# Patient Record
Sex: FEMALE | Race: BLACK OR AFRICAN AMERICAN | NOT HISPANIC OR LATINO | URBAN - METROPOLITAN AREA
[De-identification: names, ages, dates, MRNs, and addresses within clinical notes are randomized per-mention and may not be internally consistent; named-entity substitution may affect disease eponyms.]

---

## 2019-05-19 ENCOUNTER — INPATIENT (INPATIENT)
Facility: HOSPITAL | Age: 39
LOS: 2 days | Discharge: HOME | DRG: 418 | End: 2019-05-22
Attending: SURGERY | Admitting: SURGERY
Payer: MEDICAID

## 2019-05-19 VITALS
HEART RATE: 107 BPM | HEIGHT: 66 IN | OXYGEN SATURATION: 98 % | TEMPERATURE: 98 F | RESPIRATION RATE: 20 BRPM | SYSTOLIC BLOOD PRESSURE: 155 MMHG | WEIGHT: 285.94 LBS | DIASTOLIC BLOOD PRESSURE: 100 MMHG

## 2019-05-19 LAB
ALBUMIN SERPL ELPH-MCNC: 4.2 G/DL — SIGNIFICANT CHANGE UP (ref 3.3–5.2)
ALP SERPL-CCNC: 57 U/L — SIGNIFICANT CHANGE UP (ref 40–120)
ALT FLD-CCNC: 11 U/L — SIGNIFICANT CHANGE UP
ANION GAP SERPL CALC-SCNC: 11 MMOL/L — SIGNIFICANT CHANGE UP (ref 5–17)
APTT BLD: 31.6 SEC — SIGNIFICANT CHANGE UP (ref 27.5–36.3)
AST SERPL-CCNC: 37 U/L — HIGH
BASOPHILS # BLD AUTO: 0 K/UL — SIGNIFICANT CHANGE UP (ref 0–0.2)
BASOPHILS NFR BLD AUTO: 0.2 % — SIGNIFICANT CHANGE UP (ref 0–2)
BILIRUB SERPL-MCNC: 0.3 MG/DL — LOW (ref 0.4–2)
BUN SERPL-MCNC: 9 MG/DL — SIGNIFICANT CHANGE UP (ref 8–20)
CALCIUM SERPL-MCNC: 10.1 MG/DL — SIGNIFICANT CHANGE UP (ref 8.6–10.2)
CHLORIDE SERPL-SCNC: 102 MMOL/L — SIGNIFICANT CHANGE UP (ref 98–107)
CK SERPL-CCNC: 299 U/L — HIGH (ref 25–170)
CO2 SERPL-SCNC: 23 MMOL/L — SIGNIFICANT CHANGE UP (ref 22–29)
CREAT SERPL-MCNC: 0.77 MG/DL — SIGNIFICANT CHANGE UP (ref 0.5–1.3)
EOSINOPHIL # BLD AUTO: 0.1 K/UL — SIGNIFICANT CHANGE UP (ref 0–0.5)
EOSINOPHIL NFR BLD AUTO: 2.6 % — SIGNIFICANT CHANGE UP (ref 0–6)
GLUCOSE SERPL-MCNC: 100 MG/DL — SIGNIFICANT CHANGE UP (ref 70–115)
HCG SERPL-ACNC: <4 MIU/ML — SIGNIFICANT CHANGE UP
HCT VFR BLD CALC: 40.3 % — SIGNIFICANT CHANGE UP (ref 37–47)
HGB BLD-MCNC: 13.3 G/DL — SIGNIFICANT CHANGE UP (ref 12–16)
INR BLD: 1.08 RATIO — SIGNIFICANT CHANGE UP (ref 0.88–1.16)
LIDOCAIN IGE QN: 43 U/L — SIGNIFICANT CHANGE UP (ref 22–51)
LYMPHOCYTES # BLD AUTO: 2.5 K/UL — SIGNIFICANT CHANGE UP (ref 1–4.8)
LYMPHOCYTES # BLD AUTO: 45.2 % — SIGNIFICANT CHANGE UP (ref 20–55)
MCHC RBC-ENTMCNC: 29.1 PG — SIGNIFICANT CHANGE UP (ref 27–31)
MCHC RBC-ENTMCNC: 33 G/DL — SIGNIFICANT CHANGE UP (ref 32–36)
MCV RBC AUTO: 88.2 FL — SIGNIFICANT CHANGE UP (ref 81–99)
MONOCYTES # BLD AUTO: 0.3 K/UL — SIGNIFICANT CHANGE UP (ref 0–0.8)
MONOCYTES NFR BLD AUTO: 6.2 % — SIGNIFICANT CHANGE UP (ref 3–10)
NEUTROPHILS # BLD AUTO: 2.5 K/UL — SIGNIFICANT CHANGE UP (ref 1.8–8)
NEUTROPHILS NFR BLD AUTO: 45.6 % — SIGNIFICANT CHANGE UP (ref 37–73)
PLATELET # BLD AUTO: 296 K/UL — SIGNIFICANT CHANGE UP (ref 150–400)
POTASSIUM SERPL-MCNC: 4.5 MMOL/L — SIGNIFICANT CHANGE UP (ref 3.5–5.3)
POTASSIUM SERPL-SCNC: 4.5 MMOL/L — SIGNIFICANT CHANGE UP (ref 3.5–5.3)
PROT SERPL-MCNC: 9.5 G/DL — HIGH (ref 6.6–8.7)
PROTHROM AB SERPL-ACNC: 12.4 SEC — SIGNIFICANT CHANGE UP (ref 10–12.9)
RBC # BLD: 4.57 M/UL — SIGNIFICANT CHANGE UP (ref 4.4–5.2)
RBC # FLD: 12.7 % — SIGNIFICANT CHANGE UP (ref 11–15.6)
SODIUM SERPL-SCNC: 136 MMOL/L — SIGNIFICANT CHANGE UP (ref 135–145)
TROPONIN T SERPL-MCNC: <0.01 NG/ML — SIGNIFICANT CHANGE UP (ref 0–0.06)
WBC # BLD: 5.5 K/UL — SIGNIFICANT CHANGE UP (ref 4.8–10.8)
WBC # FLD AUTO: 5.5 K/UL — SIGNIFICANT CHANGE UP (ref 4.8–10.8)

## 2019-05-19 PROCEDURE — 71045 X-RAY EXAM CHEST 1 VIEW: CPT | Mod: 26

## 2019-05-19 PROCEDURE — 99285 EMERGENCY DEPT VISIT HI MDM: CPT | Mod: 25

## 2019-05-19 PROCEDURE — 93010 ELECTROCARDIOGRAM REPORT: CPT

## 2019-05-19 PROCEDURE — 76705 ECHO EXAM OF ABDOMEN: CPT | Mod: 26

## 2019-05-19 RX ORDER — FAMOTIDINE 10 MG/ML
20 INJECTION INTRAVENOUS ONCE
Refills: 0 | Status: COMPLETED | OUTPATIENT
Start: 2019-05-19 | End: 2019-05-19

## 2019-05-19 RX ORDER — MORPHINE SULFATE 50 MG/1
6 CAPSULE, EXTENDED RELEASE ORAL ONCE
Refills: 0 | Status: DISCONTINUED | OUTPATIENT
Start: 2019-05-19 | End: 2019-05-19

## 2019-05-19 RX ADMIN — FAMOTIDINE 20 MILLIGRAM(S): 10 INJECTION INTRAVENOUS at 22:13

## 2019-05-19 RX ADMIN — MORPHINE SULFATE 6 MILLIGRAM(S): 50 CAPSULE, EXTENDED RELEASE ORAL at 22:13

## 2019-05-19 NOTE — ED ADULT TRIAGE NOTE - CHIEF COMPLAINT QUOTE
pt c/o sharp back pain and chest pain that began three hrs ago. pt was sitting when symptoms began. pt denies n/v.

## 2019-05-19 NOTE — ED PROVIDER NOTE - CROS ED CONS ALL NEG
Impression: Dry eye syndrome of bilateral lacrimal glands: H04.123. Plan: Patient instructed to use artificial tears as needed. Patient instructed to apply warm compresses.
Impression: Nonexudative age-related macular degeneration, bilateral, early dry stage: H35.3131.  Plan: Jacklyn Pinto
Impression: Presbyopia: H52.4.  Plan:
Impression: Retinal hemorrhage, left eye: H35.62.  Plan: full cardiovascular work up with pcp including carotid artery ultrasound
- - -

## 2019-05-19 NOTE — ED STATDOCS - PROGRESS NOTE DETAILS
37 y/o F pt presents to the ED c/o chest pain starting 3 hours ago with assoc. SOB. Pt's pain is described as sharp, and radiates into her back and R upper part of her abdomen. States she was sitting down when the pain came on. Denies nausea. No further complaints at this time.

## 2019-05-19 NOTE — ED PROVIDER NOTE - OBJECTIVE STATEMENT
37 y/o F, hx of hysterectomy, , HTN, and multiple myeloma, presents to the ED c/o right upper abdominal pain, onset 4 hrs ago.  Pain is constant in nature and non radiating.  Pain is worse when bending over.  Pt also notes mid back pain.  Notes similar sx in the past that is worse with food intake.  States sx resolved on their own in the past, but appear to have returned. Associated sx include nausea.  Pt states that she has been told she has gallbladder issues in the past.  Denies self medicating for sx.  Denies fever, diaphoresis, visual changes, chest pain, SOB, vomiting, diarrhea, hematuria, dysuria, or HA.

## 2019-05-19 NOTE — ED PROVIDER NOTE - CLINICAL SUMMARY MEDICAL DECISION MAKING FREE TEXT BOX
Pt with acute onset abdominal pain, hx of gallbladder disease, with nausea, will obtain labs, US, give analgesic, and reeval

## 2019-05-19 NOTE — ED PROVIDER NOTE - PROGRESS NOTE DETAILS
Labs and US are as noted. Patient still with significant pain localized to the RUQ. Surgery called for consultation. Patient with continued RUQ pain. HIDA ordered. Will place in obs.

## 2019-05-19 NOTE — ED ADULT NURSE NOTE - NSIMPLEMENTINTERV_GEN_ALL_ED
Implemented All Fall with Harm Risk Interventions:  [place] to call system. Call bell, personal items and telephone within reach. Instruct patient to call for assistance. Room bathroom lighting operational. Non-slip footwear when patient is off stretcher. Physically safe environment: no spills, clutter or unnecessary equipment. Stretcher in lowest position, wheels locked, appropriate side rails in place. Provide visual cue, wrist band, yellow gown, etc. Monitor gait and stability. Monitor for mental status changes and reorient to person, place, and time. Review medications for side effects contributing to fall risk. Reinforce activity limits and safety measures with patient and family. Provide visual clues: red socks.

## 2019-05-19 NOTE — ED ADULT NURSE NOTE - OBJECTIVE STATEMENT
pt presents with c/o sharp back pain rad to her front pt presents with c/o sharp back pain rad to her front, + nausea with c/o chest pain . pt reports she had similar pain in the past that has resolved on its own. reports gallbladder problems in the past but never followed up outpt. currently visiting from [place]. denies fever and chills. ( poor historian)

## 2019-05-20 DIAGNOSIS — K81.0 ACUTE CHOLECYSTITIS: ICD-10-CM

## 2019-05-20 DIAGNOSIS — Z98.891 HISTORY OF UTERINE SCAR FROM PREVIOUS SURGERY: Chronic | ICD-10-CM

## 2019-05-20 DIAGNOSIS — Z90.710 ACQUIRED ABSENCE OF BOTH CERVIX AND UTERUS: Chronic | ICD-10-CM

## 2019-05-20 LAB
APPEARANCE UR: CLEAR — SIGNIFICANT CHANGE UP
BILIRUB UR-MCNC: NEGATIVE — SIGNIFICANT CHANGE UP
COLOR SPEC: YELLOW — SIGNIFICANT CHANGE UP
DIFF PNL FLD: ABNORMAL
GLUCOSE UR QL: NEGATIVE MG/DL — SIGNIFICANT CHANGE UP
KETONES UR-MCNC: ABNORMAL
LEUKOCYTE ESTERASE UR-ACNC: ABNORMAL
NITRITE UR-MCNC: NEGATIVE — SIGNIFICANT CHANGE UP
PH UR: 8 — SIGNIFICANT CHANGE UP (ref 5–8)
PROT UR-MCNC: 30 MG/DL
SP GR SPEC: 1.01 — SIGNIFICANT CHANGE UP (ref 1.01–1.02)
UROBILINOGEN FLD QL: NEGATIVE MG/DL — SIGNIFICANT CHANGE UP

## 2019-05-20 PROCEDURE — 99218: CPT

## 2019-05-20 PROCEDURE — 78226 HEPATOBILIARY SYSTEM IMAGING: CPT | Mod: 26

## 2019-05-20 PROCEDURE — 74177 CT ABD & PELVIS W/CONTRAST: CPT | Mod: 26

## 2019-05-20 PROCEDURE — 99221 1ST HOSP IP/OBS SF/LOW 40: CPT

## 2019-05-20 RX ORDER — ENOXAPARIN SODIUM 100 MG/ML
40 INJECTION SUBCUTANEOUS DAILY
Refills: 0 | Status: DISCONTINUED | OUTPATIENT
Start: 2019-05-20 | End: 2019-05-21

## 2019-05-20 RX ORDER — SODIUM CHLORIDE 9 MG/ML
2000 INJECTION, SOLUTION INTRAVENOUS ONCE
Refills: 0 | Status: COMPLETED | OUTPATIENT
Start: 2019-05-20 | End: 2019-05-20

## 2019-05-20 RX ORDER — MORPHINE SULFATE 50 MG/1
4 CAPSULE, EXTENDED RELEASE ORAL ONCE
Refills: 0 | Status: DISCONTINUED | OUTPATIENT
Start: 2019-05-20 | End: 2019-05-20

## 2019-05-20 RX ORDER — HYDROCHLOROTHIAZIDE 25 MG
12.5 TABLET ORAL DAILY
Refills: 0 | Status: DISCONTINUED | OUTPATIENT
Start: 2019-05-20 | End: 2019-05-21

## 2019-05-20 RX ORDER — ACETAMINOPHEN 500 MG
650 TABLET ORAL EVERY 6 HOURS
Refills: 0 | Status: DISCONTINUED | OUTPATIENT
Start: 2019-05-20 | End: 2019-05-21

## 2019-05-20 RX ORDER — MORPHINE SULFATE 50 MG/1
2 CAPSULE, EXTENDED RELEASE ORAL ONCE
Refills: 0 | Status: DISCONTINUED | OUTPATIENT
Start: 2019-05-20 | End: 2019-05-20

## 2019-05-20 RX ORDER — KETOROLAC TROMETHAMINE 30 MG/ML
15 SYRINGE (ML) INJECTION ONCE
Refills: 0 | Status: DISCONTINUED | OUTPATIENT
Start: 2019-05-20 | End: 2019-05-20

## 2019-05-20 RX ORDER — SODIUM CHLORIDE 9 MG/ML
1000 INJECTION, SOLUTION INTRAVENOUS
Refills: 0 | Status: DISCONTINUED | OUTPATIENT
Start: 2019-05-20 | End: 2019-05-21

## 2019-05-20 RX ORDER — AMLODIPINE BESYLATE 2.5 MG/1
10 TABLET ORAL DAILY
Refills: 0 | Status: DISCONTINUED | OUTPATIENT
Start: 2019-05-20 | End: 2019-05-21

## 2019-05-20 RX ADMIN — SODIUM CHLORIDE 666.67 MILLILITER(S): 9 INJECTION, SOLUTION INTRAVENOUS at 06:12

## 2019-05-20 RX ADMIN — MORPHINE SULFATE 6 MILLIGRAM(S): 50 CAPSULE, EXTENDED RELEASE ORAL at 05:30

## 2019-05-20 RX ADMIN — SODIUM CHLORIDE 2000 MILLILITER(S): 9 INJECTION, SOLUTION INTRAVENOUS at 12:10

## 2019-05-20 RX ADMIN — Medication 12.5 MILLIGRAM(S): at 06:57

## 2019-05-20 RX ADMIN — Medication 15 MILLIGRAM(S): at 01:38

## 2019-05-20 RX ADMIN — SODIUM CHLORIDE 100 MILLILITER(S): 9 INJECTION, SOLUTION INTRAVENOUS at 21:43

## 2019-05-20 RX ADMIN — MORPHINE SULFATE 4 MILLIGRAM(S): 50 CAPSULE, EXTENDED RELEASE ORAL at 11:21

## 2019-05-20 RX ADMIN — AMLODIPINE BESYLATE 10 MILLIGRAM(S): 2.5 TABLET ORAL at 06:56

## 2019-05-20 RX ADMIN — Medication 15 MILLIGRAM(S): at 05:30

## 2019-05-20 RX ADMIN — MORPHINE SULFATE 2 MILLIGRAM(S): 50 CAPSULE, EXTENDED RELEASE ORAL at 01:38

## 2019-05-20 RX ADMIN — MORPHINE SULFATE 4 MILLIGRAM(S): 50 CAPSULE, EXTENDED RELEASE ORAL at 09:49

## 2019-05-20 RX ADMIN — MORPHINE SULFATE 2 MILLIGRAM(S): 50 CAPSULE, EXTENDED RELEASE ORAL at 05:30

## 2019-05-20 NOTE — H&P ADULT - ATTENDING COMMENTS
Seen and examined.    Hx as per resident note above.    NAD  Non labored resp  Abd obese, minimal RUQ tenderness, no guarding, neg Ruiz's    HIDA consistent without GB filling.  Consistent with acute cholecystitis.     Given continued RUQ pain will admit and treat as acute cholecystitis.  Will discuss further with Dr. Ramírez, possible cholecystectomy.

## 2019-05-20 NOTE — ED CDU PROVIDER INITIAL DAY NOTE - ATTENDING CONTRIBUTION TO CARE
I, Kim Whitt, participated in the care of this patient with the PA. I discussed the history and physical exam findings as well as lab results and plan of care with the PA. I agree with PA's history, physical and assessment.

## 2019-05-20 NOTE — ED ADULT NURSE REASSESSMENT NOTE - GENERAL PATIENT STATE
comfortable appearance/resting/sleeping
resting/sleeping/comfortable appearance
improvement verbalized/resting/sleeping/smiling/interactive/comfortable appearance/cooperative

## 2019-05-20 NOTE — H&P ADULT - NSHPPHYSICALEXAM_GEN_ALL_CORE
GENERAL: Morbid obesity. Alert, well developed, in no acute distress.  MENTAL STATUS: AAOx3. Appropriate affect.  HEENT: PERRLA. EOMI. MMM.  Trachea midline. No lymph node swelling or tenderness.  RESPIRATORY: CTAB. No wheezing, rales or rhonchi.  CARDIOVASCULAR: RRR. No audible murmurs, rubs or gallops.   GASTROINTESTINAL: Abdomen soft, RUQ TTP, Right costal TTP, ND, -R/-G.  No pulsatile mass, no flank tenderness or suprapubic tenderness. No hepatosplenomegaly.  NEUROLOGIC: Cranial nerves II-XII grossly intact. No focal neurological deficits. Moves all extremities spontaneously. Sensation intact bilaterally.  INTEGUMENTARY: No overt rashes or lesions, petechia or purpura. Good turgor. No edema.  MUSCULOSKELETAL: No cyanosis or clubbing. No gross deformities.   LYMPHATIC: Palpation of neck reveals no swelling or tenderness of neck nodes. Palpation of groin reveals no swelling or tenderness of groin nodes.

## 2019-05-20 NOTE — CONSULT NOTE ADULT - SUBJECTIVE AND OBJECTIVE BOX
ACUTE CARE SURGERY CONSULT    HPI: 37 y/o F w/ PMH of Multiple myeloma (in remission for 1yr), HTN and PSH of , DEJUAN, Hernia repair presenting with one day fo RUQ abdominal pain with associated nausea. Denies F/C/CP/SOB. Denies dysuria, malodorous urine, + increased urinary frequency. Patient states that she first had similar pain several years ago. The onset of the pain was associated with bending over. Patient states that she had cabbage and chicken prior to the onset of pain.     PAST MEDICAL HISTORY:  Multiple myeloma  HTN (hypertension)    PAST SURGICAL HISTORY:  H/O:   H/O total hysterectomy    ALLERGIES:  No Known Drug Allergies  Seafood (Angioedema)    FAMILY HISTORY: Noncontributory    SOCIAL HISTORY: Denies tobacco, EtOH, illicit substance use.     HOME MEDICATIONS: Denies      VITALS & I/Os:  Vital Signs Last 24 Hrs  T(C): 36.3 (20 May 2019 03:43), Max: 36.9 (19 May 2019 19:27)  T(F): 97.4 (20 May 2019 03:43), Max: 98.4 (19 May 2019 19:27)  HR: 93 (20 May 2019 03:43) (93 - 107)  BP: 122/77 (20 May 2019 03:43) (122/77 - 163/107)  BP(mean): --  RR: 18 (20 May 2019 03:43) (18 - 20)  SpO2: 100% (20 May 2019 03:43) (97% - 100%)  CAPILLARY BLOOD GLUCOSE      GENERAL: Morbid obesity. Alert, well developed, in no acute distress.  MENTAL STATUS: AAOx3. Appropriate affect.  HEENT: PERRLA. EOMI. MMM.  Trachea midline. No lymph node swelling or tenderness.  RESPIRATORY: CTAB. No wheezing, rales or rhonchi.  CARDIOVASCULAR: RRR. No audible murmurs, rubs or gallops.   GASTROINTESTINAL: Abdomen soft, RUQ TTP, Right costal TTP, ND, -R/-G.  No pulsatile mass, no flank tenderness or suprapubic tenderness. No hepatosplenomegaly.  NEUROLOGIC: Cranial nerves II-XII grossly intact. No focal neurological deficits. Moves all extremities spontaneously. Sensation intact bilaterally.  INTEGUMENTARY: No overt rashes or lesions, petechia or purpura. Good turgor. No edema.  MUSCULOSKELETAL: No cyanosis or clubbing. No gross deformities.   LYMPHATIC: Palpation of neck reveals no swelling or tenderness of neck nodes. Palpation of groin reveals no swelling or tenderness of groin nodes.    LABS:                        13.3   5.5   )-----------( 296      ( 19 May 2019 21:18 )             40.3         136  |  102  |  9.0  ----------------------------<  100  4.5   |  23.0  |  0.77    Ca    10.1      19 May 2019 21:18    TPro  9.5<H>  /  Alb  4.2  /  TBili  0.3<L>  /  DBili  x   /  AST  37<H>  /  ALT  11  /  AlkPhos  57      Lactate:    PT/INR - ( 19 May 2019 21:18 )   PT: 12.4 sec;   INR: 1.08 ratio         PTT - ( 19 May 2019 21:18 )  PTT:31.6 sec    CARDIAC MARKERS ( 19 May 2019 21:18 )  x     / <0.01 ng/mL / 299 U/L / x     / x            Urinalysis Basic - ( 20 May 2019 03:08 )    Color: Yellow / Appearance: Clear / S.010 / pH: x  Gluc: x / Ketone: Trace  / Bili: Negative / Urobili: Negative mg/dL   Blood: x / Protein: 30 mg/dL / Nitrite: Negative   Leuk Esterase: Trace / RBC: 0-2 /HPF / WBC 0-2   Sq Epi: x / Non Sq Epi: Occasional / Bacteria: x        IMAGING:       EXAM:  US GALLBLADDER                          PROCEDURE DATE:  2019          INTERPRETATION:  CLINICAL INFORMATION: Right upper quadrant pain for one   day.    COMPARISON: None available.    TECHNIQUE: Focused ultrasound of the gallbladder was performed.     FINDINGS:    Liver: Visualized portions are within normal limits.  Bile ducts: The partially visualized proximal  extra-hepatic duct   measures 4 mm    Gallbladder: Multiple mobile gallstones.  No gallbladder wall thickening.    The patient had diffuse right upper quadrant pain during ultrasound   scanning, however the sonographic Ruiz sign was negative.        IMPRESSION: Cholelithiasis.  Diffuse right upper quadrant pain with   negative sonographic Ruiz sign.  Cross-sectional imaging or nuclear   medicine HIDA scan may be obtained as clinically warranted.         EXAM:  CT ABDOMEN AND PELVIS IC                          PROCEDURE DATE:  2019          INTERPRETATION:  CLINICAL INFORMATION: Abdominal pain    COMPARISON: Gallbladder sonogram performed on the same day.    PROCEDURE:   CT of the Abdomen and Pelvis was performed with intravenous contrast.   Intravenous contrast: 95 ml Omnipaque 300. 5 ml discarded.  Oral contrast: None.  Sagittal and coronal reformats were performed.    FINDINGS:    LOWER CHEST: Mild bibasilar dependent atelectasis.    LIVER: Mild hepatic steatosis.  BILE DUCTS: Normal caliber.  GALLBLADDER: Mildly distended. Cholelithiasis. No pericholecystic   inflammatory changes.  SPLEEN: Within normal limits.  PANCREAS: Within normal limits.  ADRENALS: Within normal limits.  KIDNEYS/URETERS: Enhance symmetrically without hydronephrosis. Right   renal cyst and subcentimeter low-attenuation lesion that is too small to   characterize.    BLADDER: Within normal limits.  REPRODUCTIVE ORGANS: Uterus is absent. Involuted left ovarian corpus   luteal cyst.    BOWEL: No bowel obstruction or overt bowel wall thickening. Normal   appendix apart from a distal appendicolith.  PERITONEUM: Trace pelvic free fluid. No pneumoperitoneum or loculated   collection. No mesenteric adenopathy.  VESSELS:  Within normal limits.  RETROPERITONEUM: No lymphadenopathy.    ABDOMINAL WALL: Small fat-containing umbilical hernia.  BONES: Mild degenerative changes of the spine.    IMPRESSION:     No bowel obstruction or evidence of bowel inflammation.    Cholelithiasis. No pericholecystic inflammatory changes.    Involuted left ovarian corpus luteal cyst. Trace pelvic free fluid.

## 2019-05-20 NOTE — CONSULT NOTE ADULT - ASSESSMENT
37 y/o F w/ PMH of Multiple myeloma (in remission for 1yr), HTN and PSH of , DEJUAN, Hernia repair presenting with one day fo RUQ abdominal pain with associated nausea. No evidence of acute cholecystitis on U/S or CT A/P. No other obvious cause on imaging.   -Recommend HIDA to rule out cholecystitis  -Will update recommendations once results available.

## 2019-05-20 NOTE — CONSULT NOTE ADULT - ATTENDING COMMENTS
avss  ttp to right chest wall, right upper quadrant and right flank  labs wnl, imaging reviewed  clinically no acute cholecystitis , recommend po trial vs hida, will follow.

## 2019-05-20 NOTE — ED ADULT NURSE REASSESSMENT NOTE - NSIMPLEMENTINTERV_GEN_ALL_ED
Implemented All Universal Safety Interventions:  [place] to call system. Call bell, personal items and telephone within reach. Instruct patient to call for assistance. Room bathroom lighting operational. Non-slip footwear when patient is off stretcher. Physically safe environment: no spills, clutter or unnecessary equipment. Stretcher in lowest position, wheels locked, appropriate side rails in place.
Implemented All Universal Safety Interventions:  [place] to call system. Call bell, personal items and telephone within reach. Instruct patient to call for assistance. Room bathroom lighting operational. Non-slip footwear when patient is off stretcher. Physically safe environment: no spills, clutter or unnecessary equipment. Stretcher in lowest position, wheels locked, appropriate side rails in place.

## 2019-05-20 NOTE — H&P ADULT - HISTORY OF PRESENT ILLNESS
39 y/o F w/ PMH of Multiple myeloma (in remission for 1yr), HTN and PSH of , DEJUAN, Hernia repair presenting with one day fo RUQ abdominal pain with associated nausea. Denies F/C/CP/SOB. Denies dysuria, malodorous urine, + increased urinary frequency. Patient states that she first had similar pain several years ago. The onset of the pain was associated with bending over. Patient states that she had cabbage and chicken prior to the onset of pain.

## 2019-05-20 NOTE — ED CDU PROVIDER INITIAL DAY NOTE - PROGRESS NOTE DETAILS
Patient seen by surgery. Recommending HIDA. Pending negative scan, PO challenge.  PCP: Does not remember name; in [place], [place] Patient with cholelithiasis, without evidence of acute cholecystitis. Pain persists. Patient seen by surgery. Recommending HIDA. Pending negative scan, PO challenge.  PCP: Does not remember name; in [place], CT Radiology called and reports + acute cesar. Surg called and will see pt soon. PT told of results. awaiting surg.

## 2019-05-20 NOTE — ED CDU PROVIDER DISPOSITION NOTE - ATTENDING CONTRIBUTION TO CARE
I, Kim Whitt, participated in the care of this patient with the PA. I discussed the history and physical exam findings as well as lab results and plan of care with the PA. I agree with PA's history, physical and assessment. I agree with disposition.

## 2019-05-20 NOTE — ED ADULT NURSE REASSESSMENT NOTE - NS ED NURSE REASSESS COMMENT FT1
Pt alert and oriented, no apparent distress noted at this time. Pt handed off to jenae PATTERSON in stable condition.
Pt handed off to RN BRYAN in stable condition. Pt continues to refuse cardiac monitor. Pt with bolus infusing as ordered. Pt oriented to unit, plan of care explained. Call bell given to pt and call bell system explained to pt. No apparent distress noted at this time.
Surgery team at bedside, MD Ramírez and resident at bedside aura mtz.
assumed care of pt @ 0640, report received from camron PATTERSON charting as noted. Pt AOx4 in NAD, Vital Signs Stable. Pt c/o mild right upper abd pain radiating to back. Pt verbalizes some relief from pain from morphine and toradol medications. Pt currently refusing cardiac monitor, PA aware. HR is regular, lung sounds are clear b/l, skin is warm, dry and appropriate for age and race. pt educated on plan of care and observation stay. Plan of care taught back to RN. Proficiency determined from successful pt teach back. Pt oriented to unit, staff, and room. Pt reeducated on call bell use. Bed locked in lowest position, call bell within reach. All questions and concerns addressed.
pt continues to refuse to have telemetry monitor on. " Im too uncomfortable with them." reasons for the monitor reviewed with pt.
pt talking on cellphone , will comeback to start IV, pt given ua cup for spec.
returned from [facility], reports feeling much better after the morphine.
Pt is sleeping in stretcher comfortably at this time, no apparent distress noted. Pt easily arousable to verbal stimuli. Pt safety maintained, RN reinforced use of call bell when needed. RN educated and updated pt on plan of care. Pt expresses understanding, and able to teach plan of care back to RN. RN will continue to update pt regarding plan of care.
Assumed pt care, pt complaining of abdominal pain, medicated as ordered, pt is A+Ox3, VS as noted. Pt educated on plan of care, able to successfully teach back plan of care to RN. RN will continue to update pt throughout ED stay.
Pt sleeping comfortably in stretcher, verbalizes improvement in pain s/p medication as ordered. Pt updated on plan of care, awaiting CT at this time, able to successfully teach back plan of care to RN. RN will continue to update pt throughout ED stay.

## 2019-05-20 NOTE — ED CDU PROVIDER DISPOSITION NOTE - CLINICAL COURSE
37 y/o F, hx of hysterectomy, , HTN, and multiple myeloma, presents to the ED c/o right upper abdominal pain, onset 4 hrs ago.  Pain is constant in nature and non radiating.  Pain is worse when bending over.  Pt also notes mid back pain.  Notes similar sx in the past that is worse with food intake.  States sx resolved on their own in the past, but appear to have returned. Associated sx include nausea.  Pt states that she has been told she has gallbladder issues in the past.  Denies self medicating for sx.  Denies fever, diaphoresis, visual changes, chest pain, SOB, vomiting, diarrhea, hematuria, dysuria, or HA. PT had HIDA and positive for acute cesar. Surg saw pt and will admit.

## 2019-05-20 NOTE — H&P ADULT - ASSESSMENT
39 y/o F w/ PMH of Multiple myeloma (in remission for 1yr), HTN and PSH of , DEJUAN, Hernia repair presenting with one day fo RUQ abdominal pain with associated nausea. No evidence of acute cholecystitis on U/S or CT A/P.  - HIDA positive for acute cholecystitis    Plan:  - Admit to [facility] for acute cholecystitis  - Will need cholecystectomy on this admission  - IV fluids  - Restart home medications  - NPO at midnight

## 2019-05-20 NOTE — ED ADULT NURSE REASSESSMENT NOTE - INV PAIN INTERVENTIONS-NUMBER SCALE
medicated as ordered./multiple medication modalities
single medication modality
single medication modality

## 2019-05-21 LAB
ALLERGY+IMMUNOLOGY DIAG STUDY NOTE: SIGNIFICANT CHANGE UP
ANION GAP SERPL CALC-SCNC: 14 MMOL/L — SIGNIFICANT CHANGE UP (ref 5–17)
BASOPHILS # BLD AUTO: 0 K/UL — SIGNIFICANT CHANGE UP (ref 0–0.2)
BASOPHILS NFR BLD AUTO: 0.5 % — SIGNIFICANT CHANGE UP (ref 0–2)
BLD GP AB SCN SERPL QL: ABNORMAL
BUN SERPL-MCNC: 7 MG/DL — LOW (ref 8–20)
CALCIUM SERPL-MCNC: 9.6 MG/DL — SIGNIFICANT CHANGE UP (ref 8.6–10.2)
CHLORIDE SERPL-SCNC: 102 MMOL/L — SIGNIFICANT CHANGE UP (ref 98–107)
CO2 SERPL-SCNC: 24 MMOL/L — SIGNIFICANT CHANGE UP (ref 22–29)
CREAT SERPL-MCNC: 0.57 MG/DL — SIGNIFICANT CHANGE UP (ref 0.5–1.3)
DIR ANTIGLOB POLYSPECIFIC INTERPRETATION: SIGNIFICANT CHANGE UP
EOSINOPHIL # BLD AUTO: 0.1 K/UL — SIGNIFICANT CHANGE UP (ref 0–0.5)
EOSINOPHIL NFR BLD AUTO: 3.2 % — SIGNIFICANT CHANGE UP (ref 0–6)
GLUCOSE SERPL-MCNC: 84 MG/DL — SIGNIFICANT CHANGE UP (ref 70–115)
HCT VFR BLD CALC: 38.9 % — SIGNIFICANT CHANGE UP (ref 37–47)
HGB BLD-MCNC: 12.9 G/DL — SIGNIFICANT CHANGE UP (ref 12–16)
LYMPHOCYTES # BLD AUTO: 1.9 K/UL — SIGNIFICANT CHANGE UP (ref 1–4.8)
LYMPHOCYTES # BLD AUTO: 50.3 % — SIGNIFICANT CHANGE UP (ref 20–55)
MAGNESIUM SERPL-MCNC: 1.9 MG/DL — SIGNIFICANT CHANGE UP (ref 1.6–2.6)
MCHC RBC-ENTMCNC: 29.4 PG — SIGNIFICANT CHANGE UP (ref 27–31)
MCHC RBC-ENTMCNC: 33.2 G/DL — SIGNIFICANT CHANGE UP (ref 32–36)
MCV RBC AUTO: 88.6 FL — SIGNIFICANT CHANGE UP (ref 81–99)
MONOCYTES # BLD AUTO: 0.3 K/UL — SIGNIFICANT CHANGE UP (ref 0–0.8)
MONOCYTES NFR BLD AUTO: 7.4 % — SIGNIFICANT CHANGE UP (ref 3–10)
NEUTROPHILS # BLD AUTO: 1.5 K/UL — LOW (ref 1.8–8)
NEUTROPHILS NFR BLD AUTO: 38.6 % — SIGNIFICANT CHANGE UP (ref 37–73)
PHOSPHATE SERPL-MCNC: 3.7 MG/DL — SIGNIFICANT CHANGE UP (ref 2.4–4.7)
PLATELET # BLD AUTO: 290 K/UL — SIGNIFICANT CHANGE UP (ref 150–400)
POTASSIUM SERPL-MCNC: 3.3 MMOL/L — LOW (ref 3.5–5.3)
POTASSIUM SERPL-SCNC: 3.3 MMOL/L — LOW (ref 3.5–5.3)
RBC # BLD: 4.39 M/UL — LOW (ref 4.4–5.2)
RBC # FLD: 12.7 % — SIGNIFICANT CHANGE UP (ref 11–15.6)
SODIUM SERPL-SCNC: 140 MMOL/L — SIGNIFICANT CHANGE UP (ref 135–145)
TYPE + AB SCN PNL BLD: SIGNIFICANT CHANGE UP
WBC # BLD: 3.8 K/UL — LOW (ref 4.8–10.8)
WBC # FLD AUTO: 3.8 K/UL — LOW (ref 4.8–10.8)

## 2019-05-21 PROCEDURE — 47562 LAPAROSCOPIC CHOLECYSTECTOMY: CPT

## 2019-05-21 PROCEDURE — 86077 PHYS BLOOD BANK SERV XMATCH: CPT

## 2019-05-21 PROCEDURE — 88304 TISSUE EXAM BY PATHOLOGIST: CPT | Mod: 26

## 2019-05-21 RX ORDER — ACETAMINOPHEN 500 MG
650 TABLET ORAL EVERY 6 HOURS
Refills: 0 | Status: DISCONTINUED | OUTPATIENT
Start: 2019-05-21 | End: 2019-05-22

## 2019-05-21 RX ORDER — SODIUM CHLORIDE 9 MG/ML
1000 INJECTION, SOLUTION INTRAVENOUS
Refills: 0 | Status: DISCONTINUED | OUTPATIENT
Start: 2019-05-21 | End: 2019-05-21

## 2019-05-21 RX ORDER — METOCLOPRAMIDE HCL 10 MG
10 TABLET ORAL ONCE
Refills: 0 | Status: DISCONTINUED | OUTPATIENT
Start: 2019-05-21 | End: 2019-05-21

## 2019-05-21 RX ORDER — TRAMADOL HYDROCHLORIDE 50 MG/1
50 TABLET ORAL EVERY 6 HOURS
Refills: 0 | Status: DISCONTINUED | OUTPATIENT
Start: 2019-05-21 | End: 2019-05-22

## 2019-05-21 RX ORDER — AMLODIPINE BESYLATE 2.5 MG/1
10 TABLET ORAL DAILY
Refills: 0 | Status: DISCONTINUED | OUTPATIENT
Start: 2019-05-21 | End: 2019-05-22

## 2019-05-21 RX ORDER — ONDANSETRON 8 MG/1
4 TABLET, FILM COATED ORAL ONCE
Refills: 0 | Status: DISCONTINUED | OUTPATIENT
Start: 2019-05-21 | End: 2019-05-21

## 2019-05-21 RX ORDER — FENTANYL CITRATE 50 UG/ML
25 INJECTION INTRAVENOUS
Refills: 0 | Status: DISCONTINUED | OUTPATIENT
Start: 2019-05-21 | End: 2019-05-21

## 2019-05-21 RX ORDER — FENTANYL CITRATE 50 UG/ML
50 INJECTION INTRAVENOUS
Refills: 0 | Status: DISCONTINUED | OUTPATIENT
Start: 2019-05-21 | End: 2019-05-21

## 2019-05-21 RX ORDER — ENOXAPARIN SODIUM 100 MG/ML
40 INJECTION SUBCUTANEOUS DAILY
Refills: 0 | Status: DISCONTINUED | OUTPATIENT
Start: 2019-05-21 | End: 2019-05-22

## 2019-05-21 RX ORDER — HYDROCHLOROTHIAZIDE 25 MG
12.5 TABLET ORAL DAILY
Refills: 0 | Status: DISCONTINUED | OUTPATIENT
Start: 2019-05-21 | End: 2019-05-22

## 2019-05-21 RX ADMIN — AMLODIPINE BESYLATE 10 MILLIGRAM(S): 2.5 TABLET ORAL at 05:04

## 2019-05-21 RX ADMIN — Medication 12.5 MILLIGRAM(S): at 05:04

## 2019-05-21 RX ADMIN — FENTANYL CITRATE 50 MICROGRAM(S): 50 INJECTION INTRAVENOUS at 13:19

## 2019-05-21 RX ADMIN — FENTANYL CITRATE 50 MICROGRAM(S): 50 INJECTION INTRAVENOUS at 13:10

## 2019-05-21 RX ADMIN — TRAMADOL HYDROCHLORIDE 50 MILLIGRAM(S): 50 TABLET ORAL at 13:00

## 2019-05-21 RX ADMIN — ENOXAPARIN SODIUM 40 MILLIGRAM(S): 100 INJECTION SUBCUTANEOUS at 18:17

## 2019-05-21 NOTE — PROGRESS NOTE ADULT - SUBJECTIVE AND OBJECTIVE BOX
The patient is a 38y old female who presents with a chief complaint of Acute Cholecystitis.  She is scheduled to have a LAPAROSCOPIC CHOLECYSTECTOMY.                  (20 May 2019 13:14)      PAST MEDICAL HISTORY:  Multiple myeloma in remission post chemotherapy  Hypertension x 6 years      PAST SURGICAL HISTORY:    Total hysterectomy      MEDICATIONS  (STANDING):  amLODIPine   Tablet 10 milliGRAM(s) Oral daily  enoxaparin Injectable 40 milliGRAM(s) SubCutaneous daily  hydrochlorothiazide 12.5 milliGRAM(s) Oral daily  multiple electrolytes Injection Type 1 1000 milliLiter(s) (100 mL/Hr) IV Continuous <Continuous>    MEDICATIONS  (PRN):  acetaminophen   Tablet .. 650 milliGRAM(s) Oral every 6 hours PRN Mild Pain (1 - 3)      Allergies:     No Known Drug Allergies                    Seafood (Angioedema)    SOCIAL HISTORY:     The patient says that she doesn't smoke, drink alcohol or use illicit drugs.                        13.3   5.5   )-----------( 296      ( 19 May 2019 21:18 )             40.3     PT/INR - ( 19 May 2019 21:18 )   PT: 12.4 sec;   INR: 1.08 ratio       PTT - ( 19 May 2019 21:18 )  PTT:31.6 sec        136  |  102  |  9.0  ----------------------------<  100  4.5   |  23.0  |  0.77    Ca    10.1      19 May 2019 21:18    TPro  9.5<H>  /  Alb  4.2  /  TBili  0.3<L>  /  DBili  x   /  AST  37<H>  /  ALT  11  /  AlkPhos  57      EKG:  -  2019  Normal sinus rhythm  Nonspecific T wave abnormality  Abnormal ECG    TT ECHO:  None    ASA # = 3  Mallampati # = 3 The patient is a 38y old female who presents with a chief complaint of Acute Cholecystitis.  She is scheduled to have a LAPAROSCOPIC CHOLECYSTECTOMY.                  (20 May 2019 13:14)      PAST MEDICAL HISTORY:  Multiple myeloma in remission for 2 years post chemotherapy  Hypertension x 6 years  Morbid obesity - B.M.I. = 46.2    PAST SURGICAL HISTORY:    Total hysterectomy      MEDICATIONS  (STANDING):  amLODIPine   Tablet 10 milliGRAM(s) Oral daily  enoxaparin Injectable 40 milliGRAM(s) SubCutaneous daily  hydrochlorothiazide 12.5 milliGRAM(s) Oral daily  multiple electrolytes Injection Type 1 1000 milliLiter(s) (100 mL/Hr) IV Continuous <Continuous>    MEDICATIONS  (PRN):  acetaminophen   Tablet .. 650 milliGRAM(s) Oral every 6 hours PRN Mild Pain (1 - 3)      Allergies:     No Known Drug Allergies                    Seafood (Angioedema)    SOCIAL HISTORY:     The patient says that she doesn't smoke, drink alcohol or use illicit drugs.                        13.3   5.5   )-----------( 296      ( 19 May 2019 21:18 )             40.3     PT/INR - ( 19 May 2019 21:18 )   PT: 12.4 sec;   INR: 1.08 ratio       PTT - ( 19 May 2019 21:18 )  PTT:31.6 sec        136  |  102  |  9.0  ----------------------------<  100  4.5   |  23.0  |  0.77    Ca    10.1      19 May 2019 21:18    TPro  9.5<H>  /  Alb  4.2  /  TBili  0.3<L>  /  DBili  x   /  AST  37<H>  /  ALT  11  /  AlkPhos  57      EKG:  -  2019  Normal sinus rhythm  Nonspecific T wave abnormality  Abnormal ECG    TT ECHO:  None    Nuclear Medicine Hepatobilliary Imaging. - 2019  IMPRESSION:  Abnormal morphine-augmented hepatobiliary scan compatible   with acute cholecystitis.    ASA # = 3  Mallampati # = 3

## 2019-05-21 NOTE — PROGRESS NOTE ADULT - ASSESSMENT
37 y/o F w/ PMH of Multiple myeloma (in remission for 1yr), HTN and PSH of , DEJUAN, Hernia repair presenting with one day fo RUQ abdominal pain with associated nausea. No evidence of acute cholecystitis on U/S or CT A/P.  - HIDA positive for acute cholecystitis  - OR today 19 for lap cesar

## 2019-05-21 NOTE — CHART NOTE - NSCHARTNOTEFT_GEN_A_CORE
POST OPERATIVE NOTE    Patient is a 39yo F p/w cholecystitis now s/p lap csear, uncomplicated, minimal bleeding, stable intraoperatively and postoperatively.  Pt now seen at bedside resting comfortably.  Pt is ambulating, voiding, tolerating diet, and pain well controlled.  No n/v/sob/cp/f/ch.    T(C): 36.8 (05-21-19 @ 15:51), Max: 37 (05-20-19 @ 20:40)  HR: 88 (05-21-19 @ 15:51) (75 - 108)  BP: 136/96 (05-21-19 @ 15:51) (121/79 - 147/93)  RR: 18 (05-21-19 @ 15:51) (12 - 18)  SpO2: 95% (05-21-19 @ 15:51) (95% - 100%)        NAD, AAOx3, resting comfortably in bed  No respiratory distress  Abdomen soft, mildly tender at incision sites, nondistended. No guarding or rebound.  Incision sites clean, dry, intact  No peripheral edema. Normal ROM.    Imaging:     Assessment: 39yo F s/p lap cesar, uncomplicated, adequate recovery.    Plan:  -Continue pain control  -continue regular diet  -Encourage OOB, ambulation, IS  -dc home tomorrow  -work note to be written and given to patient as per patient's request

## 2019-05-22 VITALS
DIASTOLIC BLOOD PRESSURE: 82 MMHG | TEMPERATURE: 98 F | SYSTOLIC BLOOD PRESSURE: 138 MMHG | OXYGEN SATURATION: 96 % | HEART RATE: 90 BPM | RESPIRATION RATE: 18 BRPM

## 2019-05-22 PROCEDURE — 99024 POSTOP FOLLOW-UP VISIT: CPT

## 2019-05-22 RX ORDER — TRAMADOL HYDROCHLORIDE 50 MG/1
1 TABLET ORAL
Qty: 8 | Refills: 0
Start: 2019-05-22

## 2019-05-22 RX ORDER — AMLODIPINE BESYLATE 2.5 MG/1
1 TABLET ORAL
Qty: 0 | Refills: 0 | DISCHARGE
Start: 2019-05-22

## 2019-05-22 RX ADMIN — TRAMADOL HYDROCHLORIDE 50 MILLIGRAM(S): 50 TABLET ORAL at 05:50

## 2019-05-22 RX ADMIN — TRAMADOL HYDROCHLORIDE 50 MILLIGRAM(S): 50 TABLET ORAL at 06:50

## 2019-05-22 RX ADMIN — Medication 12.5 MILLIGRAM(S): at 05:50

## 2019-05-22 RX ADMIN — AMLODIPINE BESYLATE 10 MILLIGRAM(S): 2.5 TABLET ORAL at 05:50

## 2019-05-22 NOTE — DISCHARGE NOTE NURSING/CASE MANAGEMENT/SOCIAL WORK - NSDCPETBCESMAN_GEN_ALL_CORE
Hospitalist has approved for pt. To receive pain medication early this afternoon.  (4 hours after previous dose) Next dose will return to Q6 as order indicates. Yane Nunez RN    Hospitalist requested peripheral IV be placed. Picc line to be removed once infusion is complete. Yane Nunez RN   If you are a smoker, it is important for your health to stop smoking. Please be aware that second hand smoke is also harmful.

## 2019-05-22 NOTE — DISCHARGE NOTE PROVIDER - PROVIDER TOKENS
FREE:[LAST:[surgery],FIRST:[acute jackelin],PHONE:[phone]],FAX:[(   [phone]    -],ADDRESS:[[address] [place] [address]]]

## 2019-05-22 NOTE — CHART NOTE - NSCHARTNOTEFT_GEN_A_CORE
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 The K or Bivalve antigen belongs to the Ghada group of red cell antigens. It is considered to be clinically significant and associated with moderate to severe transfusion reactions and FHDN (fetal hemolytic disease of the ) In HDFN, The Anti-Bivalve antibody can also cause profound anemia in the  secondary to suppression of hematopoiesis. This effect can persist for several weeks following delivery. The Ghada antigen is only expressed on the red cells of 9% of Caucasians (91% lack the antigen) and on 2% (98% lack the antigen) of red cells of AAs. Z01.84

## 2019-05-22 NOTE — PROGRESS NOTE ADULT - SUBJECTIVE AND OBJECTIVE BOX
INTERVAL HPI/OVERNIGHT EVENTS:  No acute overnight events reported. Patient tolerating diet, voiding freely and having soft BMs as reported. Patient states that her pain is well managed at this moment. Patient denies SOB, chest pain, nausea, vomiting, fevers, chills nor diarrhea.          MEDICATIONS  (STANDING):  amLODIPine   Tablet 10 milliGRAM(s) Oral daily  enoxaparin Injectable 40 milliGRAM(s) SubCutaneous daily  hydrochlorothiazide 12.5 milliGRAM(s) Oral daily    MEDICATIONS  (PRN):  acetaminophen   Tablet .. 650 milliGRAM(s) Oral every 6 hours PRN Mild Pain (1 - 3)  traMADol 50 milliGRAM(s) Oral every 6 hours PRN Moderate Pain (4 - 6)      Vital Signs Last 24 Hrs  T(C): 37 (21 May 2019 23:32), Max: 37 (21 May 2019 23:32)  T(F): 98.6 (21 May 2019 23:32), Max: 98.6 (21 May 2019 23:32)  HR: 96 (21 May 2019 23:32) (75 - 98)  BP: 121/70 (21 May 2019 23:32) (121/70 - 147/93)  BP(mean): --  RR: 18 (21 May 2019 23:32) (12 - 18)  SpO2: 98% (21 May 2019 23:32) (95% - 100%)    PE  Constitutional: patient resting comfortably in bed, in no acute distress  HEENT: EOMI / PERRL b/l  Neck: No JVD, full ROM without pain  Respiratory: CTAB respirations are unlabored, no accessory muscle use, no conversational dyspnea  Cardiovascular: regular rate & rhythm   Gastrointestinal: Abdomen soft, mildly-tender RUQ, non-distended, no rebound tenderness / guarding, incisions c/i/d  Musculoskeletal: No joint pain, swelling or deformity; no limitation of movement      I&O's Detail      LABS:                        12.9   3.8   )-----------( 290      ( 21 May 2019 07:21 )             38.9     05-21    140  |  102  |  7.0<L>  ----------------------------<  84  3.3<L>   |  24.0  |  0.57    Ca    9.6      21 May 2019 07:21  Phos  3.7     05-21  Mg     1.9     05-21            RADIOLOGY & ADDITIONAL STUDIES:

## 2019-05-22 NOTE — DISCHARGE NOTE PROVIDER - HOSPITAL COURSE
38F presented to ED with abd pain and found to have cholecystitis. Admitted to surgical team and taken to OR On 5/21. Underwent uncomplicated  laparoscopics cholecystectomy. Stable post operatively. Diet advanced and tolerated. DVT ppx provided and pain controlled with PO medications. Stable for discharge to home on 5/22. Prescriptions sent to pharmacy. Plan for outpatient follow up.

## 2019-05-22 NOTE — PROGRESS NOTE ADULT - ASSESSMENT
Assessment: 39yo F s/p lap cesar, uncomplicated, adequate recovery.    Plan:  -Continue pain control  -continue regular diet  -Encourage OOB, ambulation, IS  -dc home  -work note to be written and given to patient as per patient's request.

## 2019-05-22 NOTE — DISCHARGE NOTE NURSING/CASE MANAGEMENT/SOCIAL WORK - NSDCDPATPORTLINK_GEN_ALL_CORE
You can access the [facility] Patient Portal, offered by [facility], by registering with the following website: [email or web address]

## 2019-05-22 NOTE — DISCHARGE NOTE PROVIDER - NSDCFUADDINST_GEN_ALL_CORE_FT
no heavy lifting or aggressive exercise for two weeks post op. Ambulate daily. No driving while on prescription pain medication. May use tylenol for pain as tolerated.

## 2019-05-22 NOTE — PROGRESS NOTE ADULT - ATTENDING COMMENTS
doing well.  tolerating diet.      NAD  abd soft    Ok for discharge.  Pt requesting to drive herself home.  Have strongly advised against driving for at least 1 week,  will release after out pt follow up.

## 2019-05-22 NOTE — DISCHARGE NOTE PROVIDER - CARE PROVIDER_API CALL
surgery, acute jackelin  [address] [facility] [address]  Phone: [phone]  Fax: (   )    -  Follow Up Time:

## 2019-05-22 NOTE — DISCHARGE NOTE PROVIDER - NSDCCPCAREPLAN_GEN_ALL_CORE_FT
PRINCIPAL DISCHARGE DIAGNOSIS  Diagnosis: Acute cholecystitis  Assessment and Plan of Treatment: Pain controlled. Return to normal ADLs, Return to regular diet. Follow up with acute care surgery as outpatient.   You may shower on post op day #2, no soaking in water. If you have steri strips or skin glue do not remove or pick. It will be removed at office visit  contact office or go to ED for any questions or conerning signs/symptoms including but not limited to persisten fever chills nausea vomiting redness discharge

## 2019-05-29 LAB — SURGICAL PATHOLOGY STUDY: SIGNIFICANT CHANGE UP

## 2019-07-10 PROCEDURE — 85027 COMPLETE CBC AUTOMATED: CPT

## 2019-07-10 PROCEDURE — 82550 ASSAY OF CK (CPK): CPT

## 2019-07-10 PROCEDURE — 86880 COOMBS TEST DIRECT: CPT

## 2019-07-10 PROCEDURE — 80053 COMPREHEN METABOLIC PANEL: CPT

## 2019-07-10 PROCEDURE — 99285 EMERGENCY DEPT VISIT HI MDM: CPT | Mod: 25

## 2019-07-10 PROCEDURE — 86905 BLOOD TYPING RBC ANTIGENS: CPT

## 2019-07-10 PROCEDURE — 83690 ASSAY OF LIPASE: CPT

## 2019-07-10 PROCEDURE — 96375 TX/PRO/DX INJ NEW DRUG ADDON: CPT

## 2019-07-10 PROCEDURE — 93005 ELECTROCARDIOGRAM TRACING: CPT

## 2019-07-10 PROCEDURE — 86901 BLOOD TYPING SEROLOGIC RH(D): CPT

## 2019-07-10 PROCEDURE — 84484 ASSAY OF TROPONIN QUANT: CPT

## 2019-07-10 PROCEDURE — 80048 BASIC METABOLIC PNL TOTAL CA: CPT

## 2019-07-10 PROCEDURE — G0378: CPT

## 2019-07-10 PROCEDURE — 84100 ASSAY OF PHOSPHORUS: CPT

## 2019-07-10 PROCEDURE — 86850 RBC ANTIBODY SCREEN: CPT

## 2019-07-10 PROCEDURE — 71045 X-RAY EXAM CHEST 1 VIEW: CPT

## 2019-07-10 PROCEDURE — 74177 CT ABD & PELVIS W/CONTRAST: CPT

## 2019-07-10 PROCEDURE — 78226 HEPATOBILIARY SYSTEM IMAGING: CPT

## 2019-07-10 PROCEDURE — 85610 PROTHROMBIN TIME: CPT

## 2019-07-10 PROCEDURE — 86870 RBC ANTIBODY IDENTIFICATION: CPT

## 2019-07-10 PROCEDURE — 84702 CHORIONIC GONADOTROPIN TEST: CPT

## 2019-07-10 PROCEDURE — 85730 THROMBOPLASTIN TIME PARTIAL: CPT

## 2019-07-10 PROCEDURE — 36415 COLL VENOUS BLD VENIPUNCTURE: CPT

## 2019-07-10 PROCEDURE — 83735 ASSAY OF MAGNESIUM: CPT

## 2019-07-10 PROCEDURE — 86900 BLOOD TYPING SEROLOGIC ABO: CPT

## 2019-07-10 PROCEDURE — 96361 HYDRATE IV INFUSION ADD-ON: CPT

## 2019-07-10 PROCEDURE — 88304 TISSUE EXAM BY PATHOLOGIST: CPT

## 2019-07-10 PROCEDURE — 76705 ECHO EXAM OF ABDOMEN: CPT

## 2019-07-10 PROCEDURE — 96376 TX/PRO/DX INJ SAME DRUG ADON: CPT

## 2019-07-10 PROCEDURE — 96374 THER/PROPH/DIAG INJ IV PUSH: CPT | Mod: XU

## 2019-07-10 PROCEDURE — 81001 URINALYSIS AUTO W/SCOPE: CPT

## 2019-07-10 PROCEDURE — A9537: CPT

## 2023-10-25 NOTE — PATIENT PROFILE ADULT - DO YOU FEEL THREATENED BY OTHERS?
Wt Readings from Last 3 Encounters:   09/08/23 92.1 kg (203 lb)   07/14/23 92.3 kg (203 lb 8 oz)   05/30/23 99.8 kg (220 lb)   Last documented weight 206.5 LB, appears stable  Patient appears euvolemic  Last echo 9/2022 EF of 65%  Continue Lasix 40 mg in a.m. and 20 mg in p.m.   Continue metoprolol 100 mg in a.m. and 50 mg at at bedtime  Continue monthly weights  Encourage heart healthy diet no

## 2024-06-21 NOTE — ED PROVIDER NOTE - RESPIRATORY [-], MLM
-Present on multiple office visits, however patient does admit to being nervous coming to the office.   -Home monitoring WNL  -Patient working on lifestyle modifications with his wife.    no shortness of breath/no cough